# Patient Record
Sex: MALE | Race: WHITE
[De-identification: names, ages, dates, MRNs, and addresses within clinical notes are randomized per-mention and may not be internally consistent; named-entity substitution may affect disease eponyms.]

---

## 2018-10-15 ENCOUNTER — HOSPITAL ENCOUNTER (OUTPATIENT)
Dept: HOSPITAL 53 - M SFHCPLAZ | Age: 33
End: 2018-10-15
Attending: INTERNAL MEDICINE
Payer: COMMERCIAL

## 2018-10-15 DIAGNOSIS — B18.2: Primary | ICD-10-CM

## 2018-10-15 LAB
ALBUMIN/GLOBULIN RATIO: 0.98 (ref 1–1.93)
ALBUMIN: 4 GM/DL (ref 3.2–5.2)
ALKALINE PHOSPHATASE: 97 U/L (ref 45–117)
ALT SERPL W P-5'-P-CCNC: 22 U/L (ref 12–78)
AST SERPL-CCNC: 18 U/L (ref 7–37)
BILIRUB CONJ SERPL-MCNC: 0.1 MG/DL (ref 0–0.2)
BILIRUBIN,TOTAL: 0.5 MG/DL (ref 0.2–1)
TOTAL PROTEIN: 8.1 GM/DL (ref 6.4–8.2)

## 2018-10-15 PROCEDURE — 80076 HEPATIC FUNCTION PANEL: CPT

## 2018-11-13 ENCOUNTER — HOSPITAL ENCOUNTER (OUTPATIENT)
Dept: HOSPITAL 53 - M WUC | Age: 33
End: 2018-11-13
Attending: PHYSICIAN ASSISTANT
Payer: COMMERCIAL
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
## 2018-11-13 DIAGNOSIS — S93.602A: ICD-10-CM

## 2018-11-13 DIAGNOSIS — R60.0: Primary | ICD-10-CM

## 2018-11-13 DIAGNOSIS — S93.402A: ICD-10-CM

## 2018-11-13 PROCEDURE — 73610 X-RAY EXAM OF ANKLE: CPT

## 2019-10-15 ENCOUNTER — HOSPITAL ENCOUNTER (EMERGENCY)
Dept: HOSPITAL 53 - M ED | Age: 34
LOS: 1 days | Discharge: HOME | End: 2019-10-16
Payer: COMMERCIAL

## 2019-10-15 VITALS — BODY MASS INDEX: 31.48 KG/M2 | HEIGHT: 64 IN | WEIGHT: 184.39 LBS

## 2019-10-15 DIAGNOSIS — L02.416: Primary | ICD-10-CM

## 2019-10-15 DIAGNOSIS — F17.200: ICD-10-CM

## 2019-10-15 DIAGNOSIS — R59.9: ICD-10-CM

## 2019-10-15 DIAGNOSIS — Z79.899: ICD-10-CM

## 2019-10-15 DIAGNOSIS — L03.116: ICD-10-CM

## 2019-10-15 LAB
ALBUMIN SERPL BCG-MCNC: 2.9 GM/DL (ref 3.2–5.2)
ALT SERPL W P-5'-P-CCNC: 16 U/L (ref 12–78)
BASOPHILS # BLD AUTO: 0.1 10^3/UL (ref 0–0.2)
BASOPHILS NFR BLD AUTO: 0.8 % (ref 0–1)
BILIRUB SERPL-MCNC: 0.4 MG/DL (ref 0.2–1)
BUN SERPL-MCNC: 14 MG/DL (ref 7–18)
CALCIUM SERPL-MCNC: 8.2 MG/DL (ref 8.5–10.1)
CHLORIDE SERPL-SCNC: 105 MEQ/L (ref 98–107)
CO2 SERPL-SCNC: 30 MEQ/L (ref 21–32)
CREAT SERPL-MCNC: 0.71 MG/DL (ref 0.7–1.3)
CRP SERPL-MCNC: 5.63 MG/DL (ref 0–0.3)
EOSINOPHIL # BLD AUTO: 0.5 10^3/UL (ref 0–0.5)
EOSINOPHIL NFR BLD AUTO: 4 % (ref 0–3)
ERYTHROCYTE [SEDIMENTATION RATE] IN BLOOD BY WESTERGREN METHOD: 83 MM/HR (ref 0–15)
GFR SERPL CREATININE-BSD FRML MDRD: > 60 ML/MIN/{1.73_M2} (ref 60–?)
GLUCOSE SERPL-MCNC: 86 MG/DL (ref 70–100)
HCT VFR BLD AUTO: 34.6 % (ref 42–52)
HGB BLD-MCNC: 11.3 G/DL (ref 13.5–17.5)
LYMPHOCYTES # BLD AUTO: 3.2 10^3/UL (ref 1.5–5)
LYMPHOCYTES NFR BLD AUTO: 25.9 % (ref 24–44)
MCH RBC QN AUTO: 26.5 PG (ref 27–33)
MCHC RBC AUTO-ENTMCNC: 32.7 G/DL (ref 32–36.5)
MCV RBC AUTO: 81 FL (ref 80–96)
MONOCYTES # BLD AUTO: 1 10^3/UL (ref 0–0.8)
MONOCYTES NFR BLD AUTO: 8 % (ref 0–5)
NEUTROPHILS # BLD AUTO: 7.6 10^3/UL (ref 1.5–8.5)
NEUTROPHILS NFR BLD AUTO: 60.7 % (ref 36–66)
PLATELET # BLD AUTO: 382 10^3/UL (ref 150–450)
POTASSIUM SERPL-SCNC: 4 MEQ/L (ref 3.5–5.1)
PROT SERPL-MCNC: 7.1 GM/DL (ref 6.4–8.2)
RBC # BLD AUTO: 4.27 10^6/UL (ref 4.3–6.1)
SODIUM SERPL-SCNC: 138 MEQ/L (ref 136–145)
WBC # BLD AUTO: 12.4 10^3/UL (ref 4–10)

## 2019-10-15 PROCEDURE — 87077 CULTURE AEROBIC IDENTIFY: CPT

## 2019-10-15 PROCEDURE — 76882 US LMTD JT/FCL EVL NVASC XTR: CPT

## 2019-10-15 PROCEDURE — 99283 EMERGENCY DEPT VISIT LOW MDM: CPT

## 2019-10-15 PROCEDURE — 87186 SC STD MICRODIL/AGAR DIL: CPT

## 2019-10-15 PROCEDURE — 87205 SMEAR GRAM STAIN: CPT

## 2019-10-15 PROCEDURE — 80053 COMPREHEN METABOLIC PANEL: CPT

## 2019-10-15 PROCEDURE — 93971 EXTREMITY STUDY: CPT

## 2019-10-15 PROCEDURE — 87040 BLOOD CULTURE FOR BACTERIA: CPT

## 2019-10-15 PROCEDURE — 85025 COMPLETE CBC W/AUTO DIFF WBC: CPT

## 2019-10-15 PROCEDURE — 86140 C-REACTIVE PROTEIN: CPT

## 2019-10-15 PROCEDURE — 85652 RBC SED RATE AUTOMATED: CPT

## 2019-10-15 PROCEDURE — 87070 CULTURE OTHR SPECIMN AEROBIC: CPT

## 2019-10-15 PROCEDURE — 96372 THER/PROPH/DIAG INJ SC/IM: CPT

## 2019-10-15 NOTE — REPVR
PROCEDURE INFORMATION: 

Exam: US Duplex Left Lower Extremity Veins, Limited 

Exam date and time: 10/15/2019 9:49 PM 

Clinical history: 34 years old, male; Edema, localized; Lower extremity, left; 

Additional info: Erythema, swelling, fell asleep with leg off bed 



TECHNIQUE: 

Imaging protocol: Real-time Duplex ultrasound of the Left Lower Extremity with 

2-D gray scale, color Doppler flow and spectral waveform analysis with image 

documentation. Limited exam focused on the left lower extremity veins. 



COMPARISON: 

No relevant prior studies available. 



FINDINGS: 

Left deep veins: Unremarkable. The common femoral, and proximal profunda 

femoral, femoral and popliteal veins are patent without thrombus. Normal 

compressibility, augmentation response and Doppler waveforms. 

Left superficial veins: Unremarkable. Saphenofemoral junction is patent without 

thrombus. 



Soft tissues: Unremarkable. 

Lymph nodes: Enlarged lymph nodes along the upper thigh, measuring up to 4.4 x 

1.5 cm. 



IMPRESSION: 

1. No sonographic evidence of deep venous thrombosis. 

2. Enlarged lymph nodes along the upper thigh, measuring up to 4.4 x 1.5 cm. 



Electronically signed by: Dinesh Moeller On 10/15/2019  22:06:24 PM

## 2019-10-15 NOTE — REPVR
PROCEDURE INFORMATION: 

Exam: US Left Non-Vascular Joint or Other Extremity Structure, Limited Lower 

Extremity 

Exam date and time: 10/15/2019 9:49 PM 

Clinical history: 34 years old, male; Cellulitis; Calf; Left; Additional info: 

Erythema, swelling, fell asleep with leg off bed 



TECHNIQUE: 

Imaging protocol: Left US Non-Vascular Joint or Other Extremity Structure. 

Limited exam of the lower extremity. 



COMPARISON: 

CR ANKLE COMPLETE 11/13/2018 3:21 PM 



FINDINGS: 

Soft tissues: Pronounced soft tissue swelling and subcutaneous edema with 

ill-defined, loculated fluid along the posterior aspect of the upper calf, 

measuring approximately 3.8 x 0.9 x 2.9 cm. 



IMPRESSION: 

Severe cellulitis with probable developing abscess, as described above. 



Electronically signed by: Dinesh Moeller On 10/15/2019  22:15:06 PM

## 2019-10-16 VITALS — DIASTOLIC BLOOD PRESSURE: 78 MMHG | SYSTOLIC BLOOD PRESSURE: 135 MMHG

## 2019-10-17 NOTE — ED PDOC
Post-Departure Follow-Up


[facility] faxed formal report of extremity us for fu mlg Lundborg-Gray,Maja MD          Oct 17, 2019 06:18

## 2019-10-24 ENCOUNTER — HOSPITAL ENCOUNTER (INPATIENT)
Dept: HOSPITAL 53 - M ED | Age: 34
LOS: 4 days | Discharge: HOME | DRG: 361 | End: 2019-10-28
Attending: INTERNAL MEDICINE | Admitting: INTERNAL MEDICINE
Payer: COMMERCIAL

## 2019-10-24 VITALS — WEIGHT: 179.9 LBS | HEIGHT: 64 IN | BODY MASS INDEX: 30.71 KG/M2

## 2019-10-24 DIAGNOSIS — L03.116: ICD-10-CM

## 2019-10-24 DIAGNOSIS — L02.416: Primary | ICD-10-CM

## 2019-10-24 DIAGNOSIS — E66.9: ICD-10-CM

## 2019-10-24 DIAGNOSIS — Z79.899: ICD-10-CM

## 2019-10-24 DIAGNOSIS — F17.200: ICD-10-CM

## 2019-10-24 LAB
ANISOCYTOSIS BLD QL SMEAR: (no result)
BASOPHILS NFR BLD MANUAL: 4 % (ref 0–1)
BUN SERPL-MCNC: 14 MG/DL (ref 7–18)
CALCIUM SERPL-MCNC: 9.6 MG/DL (ref 8.5–10.1)
CHLORIDE SERPL-SCNC: 99 MEQ/L (ref 98–107)
CO2 SERPL-SCNC: 24 MEQ/L (ref 21–32)
CREAT SERPL-MCNC: 0.99 MG/DL (ref 0.7–1.3)
CRP SERPL-MCNC: 3.08 MG/DL (ref 0–0.3)
EOSINOPHIL NFR BLD MANUAL: 8 % (ref 0–3)
ERYTHROCYTE [SEDIMENTATION RATE] IN BLOOD BY WESTERGREN METHOD: 14 MM/HR (ref 0–15)
GFR SERPL CREATININE-BSD FRML MDRD: > 60 ML/MIN/{1.73_M2} (ref 60–?)
GLUCOSE SERPL-MCNC: 83 MG/DL (ref 70–100)
HCT VFR BLD AUTO: 42.3 % (ref 42–52)
HGB BLD-MCNC: 13.9 G/DL (ref 13.5–17.5)
LYMPHOCYTES NFR BLD MANUAL: 19 % (ref 16–44)
MCH RBC QN AUTO: 26.1 PG (ref 27–33)
MCHC RBC AUTO-ENTMCNC: 32.9 G/DL (ref 32–36.5)
MCV RBC AUTO: 79.4 FL (ref 80–96)
MONOCYTES NFR BLD MANUAL: 6 % (ref 0–5)
NEUTROPHILS NFR BLD MANUAL: 63 % (ref 28–66)
PLATELET # BLD AUTO: 524 10^3/UL (ref 150–450)
PLATELET BLD QL SMEAR: (no result)
POLYCHROMASIA BLD QL SMEAR: (no result)
POTASSIUM SERPL-SCNC: 4.4 MEQ/L (ref 3.5–5.1)
RBC # BLD AUTO: 5.33 10^6/UL (ref 4.3–6.1)
SODIUM SERPL-SCNC: 133 MEQ/L (ref 136–145)
WBC # BLD AUTO: 16.8 10^3/UL (ref 4–10)

## 2019-10-24 NOTE — REP
REASON: Abscess large cellulitis.

 

FINDINGS:

 

No acute fracture or destructive osseous lesion. Tiny round low density foci are

seen in the posterior medial aspect lower leg upper aspect consistent with the

patient's history of soft-tissue infection.

 

 

Electronically Signed by

Mick Singh DO 10/25/2019 10:00 A

## 2019-10-24 NOTE — REPVR
PROCEDURE INFORMATION: 

Exam: CT Angiography Chest With Contrast 

Exam date and time: 10/24/2019 7:57 PM 

Clinical history: 34 years old, male; Abnormal findings; Abnormal diagnostic 

tests; Elevated d-dimer; Additional info: Elevated dimer 



TECHNIQUE: 

Imaging protocol: Computed tomographic angiography of the chest with 

intravenous contrast. 

3D rendering: MIP reconstructed images were created and reviewed. 

Radiation optimization: All CT scans at this facility use at least one of these 

dose optimization techniques: automated exposure control; mA and/or kV 

adjustment per patient size (includes targeted exams where dose is matched to 

clinical indication); or iterative reconstruction. 

Contrast material: ISOVUE 370; Contrast volume: 75 ml; Contrast route: IV;  



COMPARISON: 

No relevant prior studies available. 



FINDINGS: 

Pulmonary arteries: Normal. No pulmonary emboli. 

Aorta: Unremarkable. No aortic aneurysm. No aortic dissection. 

Lungs: Unremarkable. No consolidation. No masses. 

Pleural space: Unremarkable. No pneumothorax. No pleural effusion. 

Heart: Unremarkable. No cardiomegaly. No pericardial effusion. 

Lymph nodes: Unremarkable. No enlarged lymph nodes. 

Bones/joints: Unremarkable. No acute fracture. 

Soft tissues: Unremarkable. 



IMPRESSION: 

No pulmonary embolism.



Electronically signed by: Malachi Gomez On 10/24/2019  20:14:33 PM

## 2019-10-24 NOTE — HPEPDOC
[facility] Medical History & Physical


Date of Admission


Oct 24, 2019


Date of Service:  Oct 24, 2019


Primary Care Physician:  A


Other Provider


Roslyn Fatima


Attending Physician:  REGINA MELENDEZ MD





History and Physical


PRIMARY CARE PROVIDER: [facility]





CHIEF COMPLAINT: Left lower extremity cellulitis.





HISTORY OF PRESENT ILLNESS: This is a 34 yr old male that presents with left 

lower leg pain, redness and swelling associated with discharge; he denies any 

fever & denies any chills. He originally presented on October 15 with similar 

complaint. After negative ultrasound and vascular studies, along with abscess 

cultures that were positive for Staphylococcus aureus and strep Dysgalactiae Sp 

Equisim, she was as discharged with Bactrim which she took for a total of 9 days

without resolution of the symptoms. In the ED attempt was made to start patient 

on vancomycin, however the patient noticed redness in his bilateral arms, and 

severe itchiness and was switched to clindamycin.





REVIEW OF SYSTEMS:


CONSTITUTIONAL: No fevers, denies chills, denies weight loss, denies lethargy


HEENT: No rhinorrhea, no itchy eyes, no congesion, No tonsilor exudates


CARDIOVASCULAR: No murmurs no palpitations and arrhythmias


RESPIRATORY: Not cough, No SOB, no issues to report 


GASTROINTESTINAL: No nausea, no vomiting, no difficulty swallowing, no pain with

eating, no diarrhea


HEMATOLOGICAL: No bleeding


GENITOURINARY:No Issues


HEMATOLOGIC/LYMPHATIC: No swelling, No bleeding 


EXTREMITIES: 





PAST MEDICAL HISTORY:


Denies any significant past medical history





PAST SURGICAL HISTORY:


Denies any surgery 





SOCIAL HISTORY:


Single, lives alone, admits to past history of heroin use, last use was 2 years 

ago. Smokes 1 pack a day. Occasionally drinks alcohol. 





FAMILY HISTORY:


Denies any significant family history 





ALLERGIES: Please see below.





PE


VITALS:see below


GENERAL APPEARANCE: Alert no acute distress, resting comfortably in hospital 

bed, watching TV


SKIN: Warm, well perfused.


HEENT: Very poor dentition


LUNGS: Clear to auscultation bilaterally.


HEART: Normal S1, S2. No murmurs, no rubs, no gallops


ABDOMEN: Soft. No masses. Bowel sounds are present.


EXTREMITIES: Moves all extremities equally. Erythematous, slightly raised, 

slightly warm left lower extremity, calf is nontender to palpation, or squeeze, 

medially on patient's left leg . There is a noticeable red streak of purulent 

drainage,.


PULSES: 2+ upper and lower extremity .





HOME MEDICATIONS: Please see below. 





LABORATORY DATA: See below.





IMAGING: 


xray Tibia, fibula lower leg, left "No acute fracture or destructive osseous 

lesion. Tiny round low density foci are seen in the posterior medial aspect 

lower leg upper aspect consistent with the patient's history of soft-tissue 

infection."





CT imaging of the chest "No pulmonary embolism"





Extremity ultrasound, left lower leg "There is a fluid collection in the soft 

tissues at this location 5.6 x 1.7 x 5.1 cm. Abscess is not excluded."





Vascular ultrasound "Impression: No evidence of deep venous thrombosis of the 

left lower extremity femoral popliteal venous system.  A mildly enlarged left 

inguinal lymph node measures 1.3 cm in short axis."








ASSESSMENT/PLAN


This is 34 year old male with a pass medical history significant for IV drug 

use, who will be admitted for I&D of an abscess.





1. Abscess/cellulitis


- there is leukocytosis


-Previous culture positive for Staphylococcus aureus and strep Dysgalactiae Sp 

Equisim


-Pending . Blood culture, previous blood culture was negative for growth


- c/w clindamycin


-Consulted surgeon, for I/D tomorrow, patient will be made nothing by mouth 

after midnight


-Monitor for fevers





2. Tobacco Abuse


-declined nicotine patch


-smoking cessation education





3. Elevated D-dimer


->4000


-CTA negative for PE therefore no additional intervention is warranted





4.Hx of IVDU


-if he is still using IVD and skin popping this may explain the reason why he 

has an abscess


-f/u drug screen





5.Obesity


-BMI 31.1kg/m2


-check A1C 


-should participate in cardiovascular exercise 40 min 4-5 days a week 





DVT px w SCDs





Disposition pending clinical course





Vital Signs





Vital Signs








  Date Time  Temp Pulse Resp B/P (MAP) Pulse Ox O2 Delivery O2 Flow Rate FiO2


 


10/24/19 21:13 97.6 79 18 109/58 (75) 99 Room Air  











Laboratory Data


Labs 24H


Laboratory Tests 2


10/24/19 14:46: 


Lymphocytes # (Auto) , Nucleated Red Blood Cells % (auto) 0.0, Neutrophils 63, 

Lymphocytes (Manual) 19, Monocytes (Manual) 6H, Eosinophils (Manual) 8H, 

Basophils (Manual) 4H, Polychromasia 1+, Anisocytosis 1+, Platelet Estimate INCR

EASED, Erythrocyte Sedimentation Rate 14


10/24/19 15:24: 


D-Dimer, Quantitative > 4000H, Anion Gap 10, Glomerular Filtration Rate > 60.0, 

Lactic Acid Level 1.6, Calcium Level 9.6, C-Reactive Protein, Quantitative 3.08H


CBC/BMP


Laboratory Tests


10/24/19 14:46








10/24/19 15:24








Microbiology





Microbiology


10/24/19 Blood Culture, Received


           Pending


10/24/19 Blood Culture, Received


           Pending





Home Medications


Scheduled


Buprenorphine HCl/Naloxone HCl (Bupreno-Nalox 2-0.5 mg Sl Film) 1 Each Film, 1 

FILM PO TID


Duloxetine Hcl (Duloxetine HCl) 20 Mg Capsule.dr, 20 MG PO DAILY


Naproxen (Naproxen) 500 Mg Tablet, 500 MG PO BID


Sulfamethoxazole/Trimethoprim (Bactrim Ds Tablet) 1 Each Tablet, 1 TAB PO BID


   STARTED 10 DAYS ON 10/16 





Allergies


Coded Allergies:  


     No Known Allergies (Unverified , 10/15/19)





GME ATTESTATION


GME ATTESTATION


My faculty preceptor for this patient encounter was physically present during 

the encounter and was fully available. All aspects of the patient interview, 

examination, medical decision making process, and medical care plan development 

were reviewed and approved by the faculty preceptor. The faculty preceptor is 

aware and concurs with the plan as stated in the body of this note and will atte

st to such by his/her cosignature.





ATTENDING NOTE


I examined  at 1150PM and discussed the case with  and 

agree with his findings as documented above.











JOSEPH SMALLS DO       Oct 24, 2019 22:15


REGINA MELENDEZ MD                Oct 25, 2019 00:27

## 2019-10-24 NOTE — REP
LEFT CALF SOFT TISSUE ULTRASOUND:

 

Real-time sonographic evaluation of the left calf soft tissues performed.

 

There is a fluid collection in the soft tissues at this location 5.6 x 1.7 x 5.1

cm. Abscess is not excluded.

 

 

Electronically Signed by

Harish Palencia MD 10/26/2019 11:00 A

## 2019-10-25 VITALS — SYSTOLIC BLOOD PRESSURE: 110 MMHG | DIASTOLIC BLOOD PRESSURE: 72 MMHG

## 2019-10-25 VITALS — SYSTOLIC BLOOD PRESSURE: 109 MMHG | DIASTOLIC BLOOD PRESSURE: 73 MMHG

## 2019-10-25 VITALS — DIASTOLIC BLOOD PRESSURE: 66 MMHG | SYSTOLIC BLOOD PRESSURE: 119 MMHG

## 2019-10-25 VITALS — DIASTOLIC BLOOD PRESSURE: 60 MMHG | SYSTOLIC BLOOD PRESSURE: 102 MMHG

## 2019-10-25 VITALS — SYSTOLIC BLOOD PRESSURE: 112 MMHG | DIASTOLIC BLOOD PRESSURE: 68 MMHG

## 2019-10-25 VITALS — DIASTOLIC BLOOD PRESSURE: 68 MMHG | SYSTOLIC BLOOD PRESSURE: 123 MMHG

## 2019-10-25 VITALS — SYSTOLIC BLOOD PRESSURE: 104 MMHG | DIASTOLIC BLOOD PRESSURE: 59 MMHG

## 2019-10-25 VITALS — DIASTOLIC BLOOD PRESSURE: 66 MMHG | SYSTOLIC BLOOD PRESSURE: 106 MMHG

## 2019-10-25 VITALS — SYSTOLIC BLOOD PRESSURE: 113 MMHG | DIASTOLIC BLOOD PRESSURE: 67 MMHG

## 2019-10-25 LAB
BUN SERPL-MCNC: 8 MG/DL (ref 7–18)
CALCIUM SERPL-MCNC: 9.4 MG/DL (ref 8.5–10.1)
CHLORIDE SERPL-SCNC: 105 MEQ/L (ref 98–107)
CO2 SERPL-SCNC: 28 MEQ/L (ref 21–32)
CREAT SERPL-MCNC: 0.82 MG/DL (ref 0.7–1.3)
EST. AVERAGE GLUCOSE BLD GHB EST-MCNC: 103 MG/DL (ref 60–110)
GFR SERPL CREATININE-BSD FRML MDRD: > 60 ML/MIN/{1.73_M2} (ref 60–?)
GLUCOSE SERPL-MCNC: 84 MG/DL (ref 70–100)
HCT VFR BLD AUTO: 36.8 % (ref 42–52)
HGB BLD-MCNC: 12 G/DL (ref 13.5–17.5)
MCH RBC QN AUTO: 26.1 PG (ref 27–33)
MCHC RBC AUTO-ENTMCNC: 32.6 G/DL (ref 32–36.5)
MCV RBC AUTO: 80 FL (ref 80–96)
PLATELET # BLD AUTO: 477 10^3/UL (ref 150–450)
POTASSIUM SERPL-SCNC: 4.3 MEQ/L (ref 3.5–5.1)
RBC # BLD AUTO: 4.6 10^6/UL (ref 4.3–6.1)
SODIUM SERPL-SCNC: 138 MEQ/L (ref 136–145)
WBC # BLD AUTO: 11.4 10^3/UL (ref 4–10)

## 2019-10-25 PROCEDURE — 0J9P0ZZ DRAINAGE OF LEFT LOWER LEG SUBCUTANEOUS TISSUE AND FASCIA, OPEN APPROACH: ICD-10-PCS | Performed by: SURGERY

## 2019-10-25 PROCEDURE — 0HBLXZZ EXCISION OF LEFT LOWER LEG SKIN, EXTERNAL APPROACH: ICD-10-PCS | Performed by: SURGERY

## 2019-10-25 RX ADMIN — BUPRENORPHINE AND NALOXONE SCH TAB: 2; .5 TABLET SUBLINGUAL at 15:49

## 2019-10-25 RX ADMIN — SODIUM CHLORIDE, POTASSIUM CHLORIDE, SODIUM LACTATE AND CALCIUM CHLORIDE SCH MLS/HR: 600; 310; 30; 20 INJECTION, SOLUTION INTRAVENOUS at 08:04

## 2019-10-25 RX ADMIN — CLINDAMYCIN PHOSPHATE SCH MLS/HR: 600 INJECTION, SOLUTION INTRAVENOUS at 16:03

## 2019-10-25 RX ADMIN — IBUPROFEN SCH MG: 600 TABLET, FILM COATED ORAL at 21:05

## 2019-10-25 RX ADMIN — IBUPROFEN SCH MG: 600 TABLET, FILM COATED ORAL at 15:49

## 2019-10-25 RX ADMIN — BUPRENORPHINE AND NALOXONE SCH TAB: 2; .5 TABLET SUBLINGUAL at 21:04

## 2019-10-25 RX ADMIN — BUPRENORPHINE AND NALOXONE SCH TAB: 2; .5 TABLET SUBLINGUAL at 01:51

## 2019-10-25 RX ADMIN — BUPRENORPHINE AND NALOXONE SCH TAB: 2; .5 TABLET SUBLINGUAL at 08:04

## 2019-10-25 RX ADMIN — DULOXETINE SCH MG: 20 CAPSULE, DELAYED RELEASE ORAL at 15:49

## 2019-10-25 RX ADMIN — CLINDAMYCIN PHOSPHATE SCH MLS/HR: 600 INJECTION, SOLUTION INTRAVENOUS at 08:04

## 2019-10-25 RX ADMIN — ENOXAPARIN SODIUM SCH MG: 40 INJECTION SUBCUTANEOUS at 16:03

## 2019-10-25 RX ADMIN — SODIUM CHLORIDE, POTASSIUM CHLORIDE, SODIUM LACTATE AND CALCIUM CHLORIDE SCH MLS/HR: 600; 310; 30; 20 INJECTION, SOLUTION INTRAVENOUS at 00:04

## 2019-10-25 NOTE — CR.PDOC
General Surgery Consultation


Date of Consultation


10/25/19





History and Physical


CONSULT REPORT FOR: hospitalist service





REASON FOR CONSULTATION: left leg abscess





HISTORY OF PRESENT ILLNESS: 





   Patient is admitted to the hospital for failure of outpatient therapy for 

left leg cellulitis and abscess which was initially treated with oral back after

presenting to the ER on Oct. 15 with same complaints. He presents with left 

lower extremity cellulitis, that is painful, and purulent. Patient originally 

presented on October 15 with similar complaint. After negative ultrasound and 

vascular studies, and abscess culture positive for Staphylococcus aureus and 

strep Dysgalactiae Sp Equisim, with a negative blood culture. Patient was 

discharged with Bactrim. He reports that he took his Bactrim for total of 9 

days. He noticed that he did not have improvement in his lower extremity 

cellulitis,/abscess.





   He is having purulent drainage from the leg wound, denies fevers or chills or

increased pain. He reports difficulty to ambulate with affected leg. He has been

having drainage from the leg for more than a week now. 








PAST MEDICAL HISTORY:


1. history of IV drug use. Denies history of diabetes, compromised wound healing





PAST SURGICAL HISTORY: INCLUDES:


none relevant








ALLERGIES: Please see below.








HOME MEDICATIONS: Please see below.





REVIEW OF SYSTEMS:


GENERAL: Patient denies fevers or chills.


HEENT: [Denies blurred vision and double vision.  Denies ear symptoms. Denies 

hoarseness].


NECK:  Denies any neck pain


CARDIOVASCULAR: [Denies chest pain and palpitations].


NEUROLOGIC: [Denies headache, stroke and transient ischemic attack].


ENDOCRINE: Denies history of diabetes.


HEMATOLOGY/ONCOLOGY: [Denies bleeding or clotting disorder].


HEART: [Denies any chest pains, palpitations, paroxysmal dyspnea, orthopnea].


PULMONARY: [Denies chronic cough, dyspnea and wheezing].


GASTROINTESTINAL: [Denies rectal bleeding, family history of colon cancer, 

constipation, diarrhea, dysphagia, heartburn and jaundice].


GENITOURINARY: [Denies dysuria, frequency, hematuria and nocturia].


INFECTIOUS: Patient has been started on Bactrim for about 9-10 days now.


NUTRITION: [Reports good appetite].





PHYSICAL EXAMINATION:


VITALS SIGNS: Please see below.


GENERAL APPEARANCE:[Patient seen, laying in bed, awake, alert, and oriented. 

Comfortable, in no acute distress].


SKIN: [Warm and moist].


HEENT: [Normocephalic,  atraumatic. Pink palpebral conjunctiva, anicteric 

sclerae. Lips and mucosa appear moist].


NECK: [Supple, no thyromegaly. No obvious jugular venous distention].


LUNGS: [Clear to auscultation bilaterally. No wheezing appreciated].


HEART: [No chest wall abnormalities. Regular rate and rhythm with no murmurs 

appreciated].


ABDOMEN: Abdomen is , soft, . [No hepatosplenomegaly. No umbilical or groin 

herniations, nondistended. No noticeable rebound or guarding. No grimacing with 

palpation. No rebound tenderness. No masses appreciated].


EXTREMITIES: Left leg is visualized, mildly swollen compared to the right lower 

leg area with some subcutaneous fullness located posteriorly on his calf area. 

There is a medial punctate opening with drainage of purulent material when you 

place pressure at the posteromedial area of the leg. Patient reports tightness 

going up towards the posterior popliteal area. Moderate swelling of the leg to 

level of the knee. Patient able to rotate his ankles plantar flex and dorsiflex.

There is not much evident erythema though I see a previous line that was placed 

night prior for the level of erythema that was present in the ER.





ANCILLARIES:





LABORATORY DATA: Please see below.





IMAGING STUDIES: 





soft tissue US


There is a fluid collection in the soft tissues at this location 5.6 x 1.7 x 5.1


cm. Abscess is not excluded.








IMPRESSION AND PLAN:





left leg cellulitis and abscess





Patient has partially draining abscess on the left leg seems to be located more 

posteriorly than where it is draining off from. I'll bring him to the operating 

room for incision and drainage. I'll probably will need ultrasound to locate the

best area to open the abscess as it is not evident on just physical examination.

I have counseled him that most likely will have an open wound that may or may 

not need packing depending on how deep the wound ends up as. I will leave the 

decision on antibiotic treatment to the medical service. Consent has been 

obtained from the patient.





Vital Signs





Vital Signs








  Date Time  Temp Pulse Resp B/P (MAP) Pulse Ox O2 Delivery O2 Flow Rate FiO2


 


10/25/19 01:30 98.3 81 18 119/66 (83) 99 Room Air  








I&Os











I&O- Last 24 Hours up to 6 AM 


 


 10/25/19





 06:00


 


Intake Total 668 ml


 


Output Total 600 ml


 


Balance 68 ml











Laboratory Data


Labs 24H


Laboratory Tests 2


10/24/19 14:46: 


Lymphocytes # (Auto) , Nucleated Red Blood Cells % (auto) 0.0, Neutrophils 63, 

Lymphocytes (Manual) 19, Monocytes (Manual) 6H, Eosinophils (Manual) 8H, Basoph

ils (Manual) 4H, Polychromasia 1+, Anisocytosis 1+, Platelet Estimate INCREASED,

Erythrocyte Sedimentation Rate 14


10/24/19 15:24: 


D-Dimer, Quantitative > 4000H, Anion Gap 10, Glomerular Filtration Rate > 60.0, 

Lactic Acid Level 1.6, Calcium Level 9.6, C-Reactive Protein, Quantitative 3.08H


10/25/19 06:22: 


Nucleated Red Blood Cells % (auto) 0.0, Anion Gap 5L, Glomerular Filtration Rate

> 60.0, Calcium Level 9.4, Estimated Mean Plasma Glucose 103, Hemoglobin A1c 5.2


CBC/BMP


Laboratory Tests


10/24/19 14:46








10/24/19 15:24








10/25/19 06:22








Microbiology





Microbiology


10/24/19 Blood Culture, Received


           Pending


10/24/19 Blood Culture, Received


           Pending





Home Medications


Scheduled


Buprenorphine HCl/Naloxone HCl (Bupreno-Nalox 2-0.5 mg Sl Film) 1 Each Film, 1 

FILM PO TID, (Reported)


Duloxetine Hcl (Duloxetine HCl) 20 Mg Capsule.dr, 20 MG PO DAILY, (Reported)


Naproxen (Naproxen) 500 Mg Tablet, 500 MG PO BID, (Reported)


Sulfamethoxazole/Trimethoprim (Bactrim Ds Tablet) 1 Each Tablet, 1 TAB PO BID, 

(Reported)


   STARTED 10 DAYS ON 10/16 





Allergies


Coded Allergies:  


     No Known Allergies (Unverified , 10/15/19)











YANNA FRY MD         Oct 25, 2019 07:57

## 2019-10-25 NOTE — IPNPDOC
Date Seen


The patient was seen on 10/25/19.





Progress Note


SUBJECTIVE: 


Patient reported no complaints apart from LLE drainage from abscess this mor

jase. 


No significant discomfort when sitting still. 


NPO for I and D today. 





OBJECTIVE


PHYSICAL EXAMINATION:


VITAL SIGNS: Please see below. 


General: No acute distress,  Alert


Eyes: Normal sclera, EOMI, MAYO


HENT: Atraumatic, neck supple, moist mucous membranes


Cardiovascular: Normal rate, normal rhythm. No murmurs appreciated. 


Pulmonary: Clear to auscultation b/l, no wheezing


GI: Soft, nontender, nondistended


Skin: Warm and dry. LLE anterolateral aspect with small open wound with moderate

amount of drainage. surrounding by erythema. 


Neuro: CN grossly intact. No focal deficits.


Psych: oriented x 3





LABORATORY DATA, IMAGING STUDIES, MICROBIOLOGY: Please see below.





DVT prophylaxis ordered?: Lovenox 





ASSESSMENT AND PLAN: 


1. LLE abscess


- Failed outpatient oral PO treatment with active drainage on presentation. 


- For I and D with surgery today. 


- c/w Clindamycin.


2. Tobacco use


- decline nicotine patch. 


3. History of IVDU


- will  for cessation. 








DISPOSITION: .





VS, I&O, 24H, [facility]


Vital Signs/I&O





Vital Signs








  Date Time  Temp Pulse Resp B/P (MAP) Pulse Ox O2 Delivery O2 Flow Rate FiO2


 


10/25/19 01:30 98.3 81 18 119/66 (83) 99 Room Air  














I&O- Last 24 Hours up to 6 AM 


 


 10/25/19





 06:00


 


Intake Total 668 ml


 


Output Total 600 ml


 


Balance 68 ml











Laboratory Data


24H LABS


Laboratory Tests 2


10/24/19 14:46: 


Lymphocytes # (Auto) , Nucleated Red Blood Cells % (auto) 0.0, Neutrophils 63, 

Lymphocytes (Manual) 19, Monocytes (Manual) 6H, Eosinophils (Manual) 8H, 

Basophils (Manual) 4H, Polychromasia 1+, Anisocytosis 1+, Platelet Estimate 

INCREASED, Erythrocyte Sedimentation Rate 14


10/24/19 15:24: 


D-Dimer, Quantitative > 4000H, Anion Gap 10, Glomerular Filtration Rate > 60.0, 

Lactic Acid Level 1.6, Calcium Level 9.6, C-Reactive Protein, Quantitative 3.08H


10/25/19 06:22: 


Nucleated Red Blood Cells % (auto) 0.0, Anion Gap 5L, Glomerular Filtration Rate

> 60.0, Calcium Level 9.4, Estimated Mean Plasma Glucose 103, Hemoglobin A1c 5.2


CBC/BMP


Laboratory Tests


10/24/19 14:46








10/24/19 15:24








10/25/19 06:22








Microbiology





Microbiology


10/24/19 Blood Culture, Received


           Pending


10/24/19 Blood Culture, Received


           Pending











FELIX ESPINOZA MD                Oct 25, 2019 13:49

## 2019-10-26 VITALS — SYSTOLIC BLOOD PRESSURE: 108 MMHG | DIASTOLIC BLOOD PRESSURE: 59 MMHG

## 2019-10-26 VITALS — DIASTOLIC BLOOD PRESSURE: 61 MMHG | SYSTOLIC BLOOD PRESSURE: 98 MMHG

## 2019-10-26 VITALS — SYSTOLIC BLOOD PRESSURE: 102 MMHG | DIASTOLIC BLOOD PRESSURE: 62 MMHG

## 2019-10-26 VITALS — SYSTOLIC BLOOD PRESSURE: 109 MMHG | DIASTOLIC BLOOD PRESSURE: 59 MMHG

## 2019-10-26 LAB
BUN SERPL-MCNC: 17 MG/DL (ref 7–18)
CALCIUM SERPL-MCNC: 9.2 MG/DL (ref 8.5–10.1)
CHLORIDE SERPL-SCNC: 106 MEQ/L (ref 98–107)
CO2 SERPL-SCNC: 27 MEQ/L (ref 21–32)
CREAT SERPL-MCNC: 0.79 MG/DL (ref 0.7–1.3)
GFR SERPL CREATININE-BSD FRML MDRD: > 60 ML/MIN/{1.73_M2} (ref 60–?)
GLUCOSE SERPL-MCNC: 95 MG/DL (ref 70–100)
HCT VFR BLD AUTO: 33.8 % (ref 42–52)
HGB BLD-MCNC: 11.1 G/DL (ref 13.5–17.5)
MCH RBC QN AUTO: 26.3 PG (ref 27–33)
MCHC RBC AUTO-ENTMCNC: 32.8 G/DL (ref 32–36.5)
MCV RBC AUTO: 80.1 FL (ref 80–96)
PLATELET # BLD AUTO: 421 10^3/UL (ref 150–450)
POTASSIUM SERPL-SCNC: 4 MEQ/L (ref 3.5–5.1)
RBC # BLD AUTO: 4.22 10^6/UL (ref 4.3–6.1)
SODIUM SERPL-SCNC: 138 MEQ/L (ref 136–145)
WBC # BLD AUTO: 11.7 10^3/UL (ref 4–10)

## 2019-10-26 RX ADMIN — IBUPROFEN SCH MG: 600 TABLET, FILM COATED ORAL at 04:01

## 2019-10-26 RX ADMIN — CLINDAMYCIN PHOSPHATE SCH MLS/HR: 600 INJECTION, SOLUTION INTRAVENOUS at 01:06

## 2019-10-26 RX ADMIN — CLINDAMYCIN PHOSPHATE SCH MLS/HR: 600 INJECTION, SOLUTION INTRAVENOUS at 16:16

## 2019-10-26 RX ADMIN — ENOXAPARIN SODIUM SCH MG: 40 INJECTION SUBCUTANEOUS at 08:44

## 2019-10-26 RX ADMIN — DULOXETINE SCH MG: 20 CAPSULE, DELAYED RELEASE ORAL at 08:44

## 2019-10-26 RX ADMIN — IBUPROFEN SCH MG: 600 TABLET, FILM COATED ORAL at 16:16

## 2019-10-26 RX ADMIN — CLINDAMYCIN PHOSPHATE SCH MLS/HR: 600 INJECTION, SOLUTION INTRAVENOUS at 08:45

## 2019-10-26 RX ADMIN — BUPRENORPHINE AND NALOXONE SCH TAB: 2; .5 TABLET SUBLINGUAL at 08:45

## 2019-10-26 RX ADMIN — IBUPROFEN SCH MG: 600 TABLET, FILM COATED ORAL at 08:45

## 2019-10-26 RX ADMIN — BUPRENORPHINE AND NALOXONE SCH TAB: 2; .5 TABLET SUBLINGUAL at 16:16

## 2019-10-26 RX ADMIN — IBUPROFEN SCH MG: 600 TABLET, FILM COATED ORAL at 21:25

## 2019-10-26 RX ADMIN — BUPRENORPHINE AND NALOXONE SCH TAB: 2; .5 TABLET SUBLINGUAL at 21:25

## 2019-10-27 VITALS — DIASTOLIC BLOOD PRESSURE: 62 MMHG | SYSTOLIC BLOOD PRESSURE: 105 MMHG

## 2019-10-27 VITALS — SYSTOLIC BLOOD PRESSURE: 101 MMHG | DIASTOLIC BLOOD PRESSURE: 58 MMHG

## 2019-10-27 VITALS — DIASTOLIC BLOOD PRESSURE: 51 MMHG | SYSTOLIC BLOOD PRESSURE: 102 MMHG

## 2019-10-27 RX ADMIN — IBUPROFEN SCH MG: 600 TABLET, FILM COATED ORAL at 21:16

## 2019-10-27 RX ADMIN — BUPRENORPHINE AND NALOXONE SCH TAB: 2; .5 TABLET SUBLINGUAL at 08:08

## 2019-10-27 RX ADMIN — BUPRENORPHINE AND NALOXONE SCH TAB: 2; .5 TABLET SUBLINGUAL at 21:16

## 2019-10-27 RX ADMIN — DULOXETINE SCH MG: 20 CAPSULE, DELAYED RELEASE ORAL at 08:08

## 2019-10-27 RX ADMIN — ENOXAPARIN SODIUM SCH MG: 40 INJECTION SUBCUTANEOUS at 08:08

## 2019-10-27 RX ADMIN — CLINDAMYCIN PHOSPHATE SCH MLS/HR: 600 INJECTION, SOLUTION INTRAVENOUS at 01:31

## 2019-10-27 RX ADMIN — CLINDAMYCIN PHOSPHATE SCH MLS/HR: 600 INJECTION, SOLUTION INTRAVENOUS at 08:08

## 2019-10-27 RX ADMIN — IBUPROFEN SCH MG: 600 TABLET, FILM COATED ORAL at 04:20

## 2019-10-27 RX ADMIN — IBUPROFEN SCH MG: 600 TABLET, FILM COATED ORAL at 08:08

## 2019-10-27 RX ADMIN — CLINDAMYCIN PHOSPHATE SCH MLS/HR: 600 INJECTION, SOLUTION INTRAVENOUS at 16:11

## 2019-10-27 RX ADMIN — IBUPROFEN SCH MG: 600 TABLET, FILM COATED ORAL at 16:11

## 2019-10-27 RX ADMIN — BUPRENORPHINE AND NALOXONE SCH TAB: 2; .5 TABLET SUBLINGUAL at 16:11

## 2019-10-27 NOTE — IPNPDOC
Date Seen


The patient was seen on 10/27/19.





Progress Note


SUBJECTIVE: 34-year-old male with past medical history of diabetes mellitus, IV 

drug use was admitted for cellulitis with abscess. He underwent incision and 

drainage in the OR, wound cultures pending. Patient was initially treated with 

oral antibiotics and failed outpatient therapy. He is currently in bed, 

comfortable, wishing to go home, without any complaints at this time apart from 

mild leg pain. He denies any shortness of breath, chest pain, vomiting, 

abdominal pain, diarrhea.





10 point review of system was negative except for above





PHYSICAL EXAMINATION:


VITAL SIGNS: Please see below.


GENERAL: No distress


HEENT: Normocephalic, atraumatic, poor oral hygiene


NECK: Supple


CARDIOVASCULAR EXAMINATION: S1, S2, no murmurs


RESPIRATORY EXAMINATION: Clear to auscultation, no wheezing


ABDOMINAL EXAMINATION: Soft, nontender, nondistended, positive bowel sounds


EXTREMITIES: Range of motion intact


SKIN: Left calf wound opened with packing, purulent drainage noted.


NEUROLOGICAL EXAMINATION: Alert and oriented 3, no focal deficits 


PSYCHIATRIC EXAMINATION: Calm and cooperative





LABORATORY DATA, IMAGING STUDIES, MICROBIOLOGY: Please see below.





DVT prophylaxis ordered?: Yes





ASSESSMENT AND PLAN: 34-year-old male with history of diabetes and IV drug use 

admitted for cellulitis with abscess status post I&D.





PROBLEMS:


1. Cellulitis with abscess: Status post I&D by surgery, wound is currently 

packed, cultures pending, continue clindamycin, pain control.





2.. Diabetes mellitus:. Sliding scale insulin with fingersticks before every 

meal [facility].





3., IV drug use:. Previous heroin user, last use 2 years ago, continue Suboxone.





DT prophylaxis: Lovenox.


GI per flexes: Not needed





VS, I&O, 24H, [facility]


Vital Signs/I&O





Vital Signs








  Date Time  Temp Pulse Resp B/P (MAP) Pulse Ox O2 Delivery O2 Flow Rate FiO2


 


10/27/19 06:00 98.0 75 18 102/51 (68) 100 Room Air  


 


10/25/19 14:55       2 














I&O- Last 24 Hours up to 6 AM 


 


 10/27/19





 06:00


 


Intake Total 2010 ml


 


Output Total 2130 ml


 


Balance -120 ml











Laboratory Data


Microbiology





Microbiology


10/25/19 Gram Stain - Final, Resulted


           


10/25/19 Wound Culture, Resulted


           Pending


10/25/19 Anaerobic Culture, Resulted


           Pending


10/24/19 Blood Culture - Preliminary, Resulted


           No Growth after 48 hours. All Specime...


10/24/19 Blood Culture - Preliminary, Resulted


           No Growth after 48 hours. All Specime...











GONDAL,KHUBAIB N. MD           Oct 27, 2019 14:07

## 2019-10-28 VITALS — SYSTOLIC BLOOD PRESSURE: 105 MMHG | DIASTOLIC BLOOD PRESSURE: 69 MMHG

## 2019-10-28 LAB
ALBUMIN SERPL BCG-MCNC: 3.2 GM/DL (ref 3.2–5.2)
ALT SERPL W P-5'-P-CCNC: 18 U/L (ref 12–78)
BILIRUB SERPL-MCNC: 0.3 MG/DL (ref 0.2–1)
BUN SERPL-MCNC: 19 MG/DL (ref 7–18)
CALCIUM SERPL-MCNC: 9.7 MG/DL (ref 8.5–10.1)
CHLORIDE SERPL-SCNC: 106 MEQ/L (ref 98–107)
CO2 SERPL-SCNC: 28 MEQ/L (ref 21–32)
CREAT SERPL-MCNC: 0.77 MG/DL (ref 0.7–1.3)
GFR SERPL CREATININE-BSD FRML MDRD: > 60 ML/MIN/{1.73_M2} (ref 60–?)
GLUCOSE SERPL-MCNC: 92 MG/DL (ref 70–100)
HCT VFR BLD AUTO: 36.4 % (ref 42–52)
HGB BLD-MCNC: 11.8 G/DL (ref 13.5–17.5)
MAGNESIUM SERPL-MCNC: 2.1 MG/DL (ref 1.8–2.4)
MCH RBC QN AUTO: 25.7 PG (ref 27–33)
MCHC RBC AUTO-ENTMCNC: 32.4 G/DL (ref 32–36.5)
MCV RBC AUTO: 79.3 FL (ref 80–96)
PHOSPHATE SERPL-MCNC: 3.5 MG/DL (ref 2.5–4.9)
PLATELET # BLD AUTO: 477 10^3/UL (ref 150–450)
POTASSIUM SERPL-SCNC: 4.1 MEQ/L (ref 3.5–5.1)
PROT SERPL-MCNC: 8.7 GM/DL (ref 6.4–8.2)
RBC # BLD AUTO: 4.59 10^6/UL (ref 4.3–6.1)
SODIUM SERPL-SCNC: 138 MEQ/L (ref 136–145)
WBC # BLD AUTO: 11.2 10^3/UL (ref 4–10)

## 2019-10-28 RX ADMIN — IBUPROFEN SCH MG: 600 TABLET, FILM COATED ORAL at 08:02

## 2019-10-28 RX ADMIN — ENOXAPARIN SODIUM SCH MG: 40 INJECTION SUBCUTANEOUS at 08:03

## 2019-10-28 RX ADMIN — ENOXAPARIN SODIUM SCH MG: 40 INJECTION SUBCUTANEOUS at 08:07

## 2019-10-28 RX ADMIN — BUPRENORPHINE AND NALOXONE SCH TAB: 2; .5 TABLET SUBLINGUAL at 08:02

## 2019-10-28 RX ADMIN — CLINDAMYCIN PHOSPHATE SCH MLS/HR: 600 INJECTION, SOLUTION INTRAVENOUS at 00:26

## 2019-10-28 RX ADMIN — CLINDAMYCIN PHOSPHATE SCH MLS/HR: 600 INJECTION, SOLUTION INTRAVENOUS at 08:03

## 2019-10-28 RX ADMIN — DULOXETINE SCH MG: 20 CAPSULE, DELAYED RELEASE ORAL at 08:02

## 2019-10-28 RX ADMIN — IBUPROFEN SCH MG: 600 TABLET, FILM COATED ORAL at 03:03

## 2019-10-28 NOTE — DS.PDOC
Discharge Summary


General


Date of Admission


Oct 24, 2019 at 21:24


Date of Discharge


10/28/2019


Attending Physician:  GONDAL,KHUBAIB N. MD





Discharge Summary


PROCEDURES PERFORMED DURING STAY: Abscess drainage.





ADMITTING DIAGNOSES: 


1. Cellulitis with abscess.





DISCHARGE DIAGNOSES:


1. Cellulitis with abscess.





COMPLICATIONS/CHIEF COMPLAINT: Cellulitis And Abcess Of Left Lower Extremity.





HISTORY OF PRESENT ILLNESS: 34-year-old male with past medical history of IV 

drug use was admitted for left lower extremity cellulitis with abscess after 

failing outpatient antibiotic therapy. He underwent surgical I&D in the OR, 

wound continues to drain purulent material, cleared for discharge by surgery 

with outpatient follow-up. Culture sensitivities are preliminarily growing strep

and anaerobic organisms, patient will be discharged on clindamycin. Patient is 

advised to change his dressings daily, wash the wound with warm water, to milk 

and drain whatever pus he can until it is gone. Patient understands and agrees 

with discharge plan..





HOSPITAL COURSE: As above. 





DISCHARGE MEDICATIONS: Please see below.


 


ALLERGIES: Please see below.





PHYSICAL EXAMINATION:


VITAL SIGNS: Please see below.


GENERAL: No distress


HEENT: Normocephalic, atraumatic, poor oral hygiene


NECK: Supple


CARDIOVASCULAR EXAMINATION: S1, S2, no murmurs


RESPIRATORY EXAMINATION: Clear to auscultation, no wheezing


ABDOMINAL EXAMINATION: Soft, nontender, nondistended, positive bowel sounds


EXTREMITIES: Left calf open wound with pus and bloody drainage


SKIN: No rash


NEUROLOGICAL EXAMINATION: Alert and oriented 3, no focal deficits 


PSYCHIATRIC EXAMINATION: Calm and cooperative





LABORATORY DATA: Please see below.





PROGNOSIS: Good





ACTIVITY: As tolerated.





DIET: Regular





DISCHARGE PLAN: Patient is to follow up with general surgeon and PCP in 1-2 

weeks





DISPOSITION: .





DISCHARGE INSTRUCTIONS:


1. As above.





DISCHARGE CONDITION: Stable.





TIME SPENT ON DISCHARGE: Greater than 35 minutes.





Vital Signs/I&Os





Vital Signs








  Date Time  Temp Pulse Resp B/P (MAP) Pulse Ox O2 Delivery O2 Flow Rate FiO2


 


10/28/19 05:44 97.9 56 18 105/69 (81) 99 Room Air  


 


10/25/19 14:55       2 














I&O- Last 24 Hours up to 6 AM 


 


 10/28/19





 06:00


 


Intake Total 1720 ml


 


Output Total 0 ml


 


Balance 1720 ml











Laboratory Data


Labs 24H


Laboratory Tests 2


10/28/19 08:37: 


Nucleated Red Blood Cells % (auto) 0.0, Anion Gap 4L, Glomerular Filtration Rate

> 60.0, Calcium Level 9.7, Phosphorus Level 3.5, Magnesium Level 2.1, Total 

Bilirubin 0.3, Aspartate Amino Transf (AST/SGOT) 13, Alanine Aminotransferase 

(ALT/SGPT) 18, Alkaline Phosphatase 74, Total Protein 8.7H, Albumin 3.2, 

Albumin/Globulin Ratio 0.58L


CBC/BMP


Laboratory Tests


10/28/19 08:37











Microbiology





Microbiology


10/25/19 Gram Stain - Final, Resulted


           


10/25/19 Wound Culture, Resulted


           Pending


10/25/19 Anaerobic Culture, Resulted


           Pending


10/24/19 Blood Culture - Preliminary, Resulted


           No Growth after 72 hours. All specime...


10/24/19 Blood Culture - Preliminary, Resulted


           No Growth after 72 hours. All specime...





Discharge Medications


Scheduled


Buprenorphine HCl/Naloxone HCl (Bupreno-Nalox 2-0.5 mg Sl Film) 1 Each Film, 1 

FILM PO TID, (Reported)


Clindamycin Hcl (Cleocin HCl) 300 Mg Capsule, 300 MG PO Q6H


Duloxetine Hcl (Duloxetine HCl) 20 Mg Capsule.dr, 20 MG PO DAILY, (Reported)





Allergies


Coded Allergies:  


     No Known Allergies (Unverified , 10/15/19)











GONDAL,KHUBAIB N. MD           Oct 28, 2019 12:23

## 2019-10-28 NOTE — IPNPDOC
Text Note


Date of Service


The patient was seen on 10/28/19.





NOTE


I reexamined the drainage wound on his left leg. This was incised and drained 

last Friday. The dressing was last changed yesterday afternoon and this was 

showing a moderate amount of staining and soaking of the cotton roll covering 

the wound. The dressings were removed which reveals roughly about a 2 x 2 

circular wound opening through to the subcutaneous tissue. There was a good 

amount of roundish maroon colored fluid drainage which initially thought was 

bleeding but on further examination was the purulent drainage from the wound. 

The induration and soft tissue swelling lateral and that the posterior calf area

has markedly improved and when placing pressure in this area I couldn't elicit 

further drainage through to the wound from the collection at the posterior late

ral area. I was able to massage a lot of drainage doing this without much 

discomfort elicited from the patient. Once I could no longer elicit further 

drainage of a left the wound then packed and placed bulky 4 x 4 and cotton roll 

dressing to the leg.








Impression and plan


Cellulitis and abscess of the left leg





I'm leaving the wound open to allow further drainage. I instructed the patient 

to ambulate as this would allow further movement of the fluid with the 

contraction of the muscles towards the wound opening. Anticipate this will 

continue the drain for another few days and then we'll try off and will allow 

the wound to contract and close the secondary healing manner.





I believe the antibiotics to the medical service. He can go home when he is 

ready to and he can follow up with me in 2 weeks' time in the clinic for ongoing

wound care.





VS,Walee, I+O


VS, Fishbone, I+O


Laboratory Tests


10/28/19 08:37











Vital Signs








  Date Time  Temp Pulse Resp B/P (MAP) Pulse Ox O2 Delivery O2 Flow Rate FiO2


 


10/28/19 05:44 97.9 56 18 105/69 (81) 99 Room Air  


 


10/25/19 14:55       2 














I&O- Last 24 Hours up to 6 AM 


 


 10/28/19





 06:00


 


Intake Total 1720 ml


 


Output Total 0 ml


 


Balance 1720 ml

















YANNA FRY MD         Oct 28, 2019 11:01

## 2019-10-28 NOTE — ROOPDOC
[facility] Report Of Operation


Report of Operation


DATE OF PROCEDURE: 10/25/19





PREPROCEDURE DIAGNOSES: Abscess left leg





POSTPROCEDURE DIAGNOSES: Abscess left leg.





PROCEDURE: Incision and drainage of abscess in the left leg. 





SURGEON: Karthikeyan Salcedo MD





ASSISTANT: Aldo Aguilera (MS III)





ANESTHESIA: Monitored anesthesia care with local anesthesia at the site of the 

I&D using 1% lidocaine and 1/4% Marcaine mixture.





ESTIMATED BLOOD LOSS: Approximately 20 mL. 





COMPLICATIONS: None. 





REMARKS: Murky brownish fluid was drained from the posterior lateral calf area.





PROCEDURE NOTE: 





   Patient has been receiving clindamycin on scheduled doses for cellulitis and 

abscess of his left leg based on sensitivity from the drainage from his wound 

culture on initial presentation to the emergency room 2 days ago. This was 

continued perioperatively. He was brought to the operating room, placed supine 

on the table monitoring leads were placed and oxygen provided via face mask. 

Prior to prepping the left leg I performed a bedside ultrasound at the area of 

the left calf with noticeable fluid collection at the posterior lateral calf 

area though this seems to be moderately improved from the emergency room 

ultrasound images. His left leg was prepped and draped in usual sterile fashion.

A stockinette placed on the foot to allow for manipulation of the leg. We paused

for a surgical timeout using both pre-incision safety checklist to verify 

correct patient, procedure site and additional clinical information prior to 

beginning the procedure





   


   He is left leg has a circular punctate opening at the lateral lower leg area 

midway between the calf and the ankle and I could elicit some brownish, maroon-

colored drainage when placing pressure lateral and posterior to it at the calf 

area. He has notable induration and thickening of the skin above this and 

lateral to the opening. A previously marked area of the skin where the 

cellulitis has demarcated in the emergency room has improved markedly since then

with only minimal amount of erythema left. I didn't really improve rated the 

subcutaneous tissue around this opening especially that of posterior and lateral

to the area with mild local anesthesia. I then enlarged the opening going 

laterally towards the calf area by making a cruciate skin incision and taking a 

portion of the skin roughly about 3 x 2 cm in size. This was deepened through 

the subcutaneous tissue but not beyond the investing fascia on the muscle of 

that compartment. I then used a hemostat to probe circumferentially with 

resulting drainage mostly at the upper lateral and posterior area of the calf. 

There were no fixed loculations that I could find was an enlarge the tract by 

spreading the hemostat roughly about 4 cm from our incision site superior latera

l and going towards the posterior calf area. I continued probing and palpating 

for further drainage and once satisfied I temporarily packed the area with a 

laparotomy pad for hemostasis. Hemostasis was checked and secured with Bovie 

cautery. I then left a 1 inch iodoform gauze his packing and directed towards 

the superior lateral area of the cruciate skin opening. Bulky gauze dressings 

were placed on top and this was secured with a Kerlix roll around the leg. 

Patient tolerated procedure well was promptly awakened and brought to the 

recovery room in stable condition.











KARTHIKEYAN SALCEDO MD         Oct 28, 2019 10:57

## 2021-07-01 ENCOUNTER — HOSPITAL ENCOUNTER (OUTPATIENT)
Dept: HOSPITAL 53 - M RADPRO | Age: 36
End: 2021-07-01
Attending: ORTHOPAEDIC SURGERY
Payer: COMMERCIAL

## 2021-07-01 DIAGNOSIS — M93.272: Primary | ICD-10-CM

## 2021-07-01 PROCEDURE — 77002 NEEDLE LOCALIZATION BY XRAY: CPT

## 2021-07-01 PROCEDURE — 20600 DRAIN/INJ JOINT/BURSA W/O US: CPT

## 2021-07-01 NOTE — REP
INDICATION:

OSTEOCHONDRITIS DISSECANS L ANKLE/TALONAVICULAR JT.



COMPARISON:

None



TECHNIQUE:

The procedure was performed by GARRY Giles, under the direct

supervision of Dr. Valles.



The benefits and risks of the procedure were explained to the patient, and an informed

consent was obtained.  Directly prior to the start of the procedure, a formal time-out

was completed in the procedure room.



The left talonavicular joint space was localized using fluoroscopic guidance.  The

skin was prepped and draped in a sterile fashion.  Approximately 1 mL of 1% Lidocaine

10 mg/ml was used as a local anesthetic, under fluoroscopic guidance this same #25

gauge needle was advanced into the left talonavicular joint space.  Approximately 0.5

mL of Isovue 300 was injected to verify placement.  Three mL of a solution containing

2 mL 1% lidocaine 10 mg/ml and 1 mL Kenalog 40 milligrams/milliliter was injected into

the joint space.  The needle was removed and hemostasis was achieved.





FINDINGS:

The patient tolerated the procedure well and there were no immediate complications.



IMPRESSION:

1.  Fluoroscopic guided left talonavicular intra-articular pain injection.



0.1 minutes of fluoroscopy time was utilized for this procedure. Some fluoroscopic

images are performed with last image hold technology.  These images require no

additional radiation.







<Electronically signed by Bonita Barrett > 07/01/21 1511

<Electronically signed by Ariel Valles > 07/01/21 1524

## 2021-07-30 ENCOUNTER — HOSPITAL ENCOUNTER (OUTPATIENT)
Dept: HOSPITAL 53 - M LAB | Age: 36
End: 2021-07-30
Attending: NURSE PRACTITIONER
Payer: COMMERCIAL

## 2021-07-30 DIAGNOSIS — R60.9: Primary | ICD-10-CM

## 2021-07-30 LAB
BASOPHILS # BLD AUTO: 0.1 10^3/UL (ref 0–0.2)
BASOPHILS NFR BLD AUTO: 1.2 % (ref 0–1)
BUN SERPL-MCNC: 11 MG/DL (ref 7–18)
CALCIUM SERPL-MCNC: 9.1 MG/DL (ref 8.5–10.1)
CHLORIDE SERPL-SCNC: 104 MEQ/L (ref 98–107)
CO2 SERPL-SCNC: 25 MEQ/L (ref 21–32)
CREAT SERPL-MCNC: 0.81 MG/DL (ref 0.7–1.3)
EOSINOPHIL # BLD AUTO: 0.2 10^3/UL (ref 0–0.5)
EOSINOPHIL NFR BLD AUTO: 2.6 % (ref 0–3)
GFR SERPL CREATININE-BSD FRML MDRD: > 60 ML/MIN/{1.73_M2} (ref 60–?)
GLUCOSE SERPL-MCNC: 108 MG/DL (ref 70–100)
HCT VFR BLD AUTO: 42.8 % (ref 42–52)
HGB BLD-MCNC: 14.5 G/DL (ref 13.5–17.5)
LYMPHOCYTES # BLD AUTO: 2.2 10^3/UL (ref 1.5–5)
LYMPHOCYTES NFR BLD AUTO: 23.9 % (ref 24–44)
MCH RBC QN AUTO: 27.1 PG (ref 27–33)
MCHC RBC AUTO-ENTMCNC: 33.9 G/DL (ref 32–36.5)
MCV RBC AUTO: 80 FL (ref 80–96)
MONOCYTES # BLD AUTO: 0.5 10^3/UL (ref 0–0.8)
MONOCYTES NFR BLD AUTO: 5 % (ref 2–8)
NEUTROPHILS # BLD AUTO: 6.3 10^3/UL (ref 1.5–8.5)
NEUTROPHILS NFR BLD AUTO: 66.9 % (ref 36–66)
NT-PRO BNP: 34 PG/ML (ref ?–125)
PLATELET # BLD AUTO: 258 10^3/UL (ref 150–450)
POTASSIUM SERPL-SCNC: 3.9 MEQ/L (ref 3.5–5.1)
RBC # BLD AUTO: 5.35 10^6/UL (ref 4.3–6.1)
SODIUM SERPL-SCNC: 135 MEQ/L (ref 136–145)
WBC # BLD AUTO: 9.3 10^3/UL (ref 4–10)

## 2021-11-18 NOTE — REP
Left lower extremity Duplex Doppler venous ultrasound:

 

Real time compression and duplex Doppler interrogation of the left lower

extremity deep venous system is performed.  The left common femoral, superficial

femoral and popliteal veins are fully compressible with transducer pressure and

demonstrate normal spontaneous and phasic flow, without evidence of deep venous

thrombosis.

 

Impression:

 

No evidence of deep venous thrombosis of the left lower extremity femoral

popliteal venous system.  A mildly enlarged left inguinal lymph node measures 1.3

cm in short axis.

 

 

Electronically Signed by

Harish Palencia MD 10/24/2019 03:31 P hypertension